# Patient Record
Sex: FEMALE | Race: OTHER | HISPANIC OR LATINO | URBAN - METROPOLITAN AREA
[De-identification: names, ages, dates, MRNs, and addresses within clinical notes are randomized per-mention and may not be internally consistent; named-entity substitution may affect disease eponyms.]

---

## 2020-12-12 ENCOUNTER — OUTPATIENT (OUTPATIENT)
Dept: OUTPATIENT SERVICES | Facility: HOSPITAL | Age: 35
LOS: 1 days | End: 2020-12-12
Payer: MEDICARE

## 2020-12-12 DIAGNOSIS — Z11.59 ENCOUNTER FOR SCREENING FOR OTHER VIRAL DISEASES: ICD-10-CM

## 2020-12-12 LAB — SARS-COV-2 RNA SPEC QL NAA+PROBE: SIGNIFICANT CHANGE UP

## 2020-12-12 PROCEDURE — U0003: CPT

## 2020-12-13 DIAGNOSIS — Z11.59 ENCOUNTER FOR SCREENING FOR OTHER VIRAL DISEASES: ICD-10-CM

## 2021-04-15 ENCOUNTER — EMERGENCY (EMERGENCY)
Facility: HOSPITAL | Age: 36
LOS: 0 days | Discharge: HOME | End: 2021-04-15
Attending: EMERGENCY MEDICINE
Payer: SELF-PAY

## 2021-04-15 VITALS
TEMPERATURE: 98 F | HEART RATE: 72 BPM | SYSTOLIC BLOOD PRESSURE: 111 MMHG | DIASTOLIC BLOOD PRESSURE: 78 MMHG | RESPIRATION RATE: 17 BRPM | OXYGEN SATURATION: 100 %

## 2021-04-15 VITALS — WEIGHT: 179.9 LBS | HEIGHT: 60 IN
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
## 2021-04-15 DIAGNOSIS — Z23 ENCOUNTER FOR IMMUNIZATION: ICD-10-CM

## 2021-04-15 DIAGNOSIS — M79.672 PAIN IN LEFT FOOT: ICD-10-CM

## 2021-04-15 DIAGNOSIS — S81.812A LACERATION WITHOUT FOREIGN BODY, LEFT LOWER LEG, INITIAL ENCOUNTER: ICD-10-CM
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
## 2021-04-15 PROCEDURE — 99283 EMERGENCY DEPT VISIT LOW MDM: CPT | Mod: 25

## 2021-04-15 PROCEDURE — 73590 X-RAY EXAM OF LOWER LEG: CPT | Mod: LT

## 2021-04-15 PROCEDURE — 90715 TDAP VACCINE 7 YRS/> IM: CPT

## 2021-04-15 PROCEDURE — 73590 X-RAY EXAM OF LOWER LEG: CPT | Mod: 26,LT

## 2021-04-15 PROCEDURE — 90471 IMMUNIZATION ADMIN: CPT

## 2021-04-15 PROCEDURE — 12002 RPR S/N/AX/GEN/TRNK2.6-7.5CM: CPT

## 2021-04-15 RX ORDER — CEPHALEXIN 500 MG
500 CAPSULE ORAL ONCE
Refills: 0 | Status: COMPLETED | OUTPATIENT
Start: 2021-04-15 | End: 2021-04-15

## 2021-04-15 RX ORDER — TETANUS TOXOID, REDUCED DIPHTHERIA TOXOID AND ACELLULAR PERTUSSIS VACCINE, ADSORBED 5; 2.5; 8; 8; 2.5 [IU]/.5ML; [IU]/.5ML; UG/.5ML; UG/.5ML; UG/.5ML
0.5 SUSPENSION INTRAMUSCULAR ONCE
Refills: 0 | Status: COMPLETED | OUTPATIENT
Start: 2021-04-15 | End: 2021-04-15

## 2021-04-15 RX ORDER — CEPHALEXIN 500 MG
1 CAPSULE ORAL
Qty: 28 | Refills: 0
Start: 2021-04-15 | End: 2021-04-21

## 2021-04-15 RX ADMIN — TETANUS TOXOID, REDUCED DIPHTHERIA TOXOID AND ACELLULAR PERTUSSIS VACCINE, ADSORBED 0.5 MILLILITER(S): 5; 2.5; 8; 8; 2.5 SUSPENSION INTRAMUSCULAR at 19:12

## 2021-04-15 RX ADMIN — Medication 500 MILLIGRAM(S): at 20:11

## 2021-04-15 NOTE — ED STATDOCS - OBJECTIVE STATEMENT
36 y/o F with no PMHx presents to the ED c/o +laceration to L calf incurred on broke piece of glass around 11AM today. No fever. NKDA. Pt is Cymro speaking, [place]  used, ID#: [id number]

## 2021-04-15 NOTE — ED ADULT NURSE NOTE - NSIMPLEMENTINTERV_GEN_ALL_ED
Implemented All Universal Safety Interventions:  [place] to call system. Call bell, personal items and telephone within reach. Instruct patient to call for assistance. Room bathroom lighting operational. Non-slip footwear when patient is off stretcher. Physically safe environment: no spills, clutter or unnecessary equipment. Stretcher in lowest position, wheels locked, appropriate side rails in place.

## 2021-04-15 NOTE — ED STATDOCS - PATIENT PORTAL LINK FT
You can access the FollowMyHealth Patient Portal offered by [facility] by registering at the following website: [email or web address]. By joining [employer]’s FollowMyHealth portal, you will also be able to view your health information using other applications (apps) compatible with our system.

## 2021-04-24 ENCOUNTER — EMERGENCY (EMERGENCY)
Facility: HOSPITAL | Age: 36
LOS: 0 days | Discharge: HOME | End: 2021-04-24
Attending: FAMILY MEDICINE
Payer: SELF-PAY

## 2021-04-24 VITALS
OXYGEN SATURATION: 96 % | HEART RATE: 81 BPM | TEMPERATURE: 98 F | SYSTOLIC BLOOD PRESSURE: 118 MMHG | RESPIRATION RATE: 18 BRPM | DIASTOLIC BLOOD PRESSURE: 81 MMHG

## 2021-04-24 VITALS — HEIGHT: 60 IN | WEIGHT: 179.02 LBS
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
## 2021-04-24 DIAGNOSIS — S81.812D LACERATION WITHOUT FOREIGN BODY, LEFT LOWER LEG, SUBSEQUENT ENCOUNTER: ICD-10-CM

## 2021-04-24 PROCEDURE — G0463: CPT

## 2021-04-24 PROCEDURE — L9995: CPT

## 2021-04-24 NOTE — ED STATDOCS - OBJECTIVE STATEMENT
Pt is a 34 yo hf with no pmh here for suture removal of continuous suture left calf. Pt is a 34 yo hf with no pmh here for suture removal of continuous suture left calf. No fever or redness.

## 2021-04-24 NOTE — ED STATDOCS - PATIENT PORTAL LINK FT
You can access the FollowMyHealth Patient Portal offered by [facility] by registering at the following website: [email or web address]. By joining [employer]’s FollowMyHealth portal, you will also be able to view your health information using other applications (apps) compatible with our system.

## 2021-04-25 PROBLEM — Z78.9 OTHER SPECIFIED HEALTH STATUS: Chronic | Status: ACTIVE | Noted: 2021-04-15

## 2021-05-01 NOTE — ED PROCEDURE NOTE - CPROC ED INDICATIONS1
From: Jesse Lambert  To: VANESSA Nguyen - CNP  Sent: 10/8/2020 2:35 PM EDT  Subject: Visit Follow-Up Question    Kallie Duane,    I came in a few weeks ago for BV, I was prescribed antibiotics for treatment. The treatment worker initially but I think the BV has returned. Can you give me a call to advise?      ThanksNilay suture removal

## 2021-08-27 ENCOUNTER — APPOINTMENT (OUTPATIENT)
Age: 36
End: 2021-08-27
Payer: COMMERCIAL

## 2021-08-27 PROCEDURE — 0011A: CPT

## 2021-09-24 ENCOUNTER — APPOINTMENT (OUTPATIENT)
Age: 36
End: 2021-09-24
Payer: COMMERCIAL

## 2021-09-24 PROCEDURE — 0012A: CPT

## 2021-10-08 PROBLEM — Z00.00 ENCOUNTER FOR PREVENTIVE HEALTH EXAMINATION: Status: ACTIVE | Noted: 2021-10-08

## 2022-01-21 ENCOUNTER — EMERGENCY (EMERGENCY)
Facility: HOSPITAL | Age: 37
LOS: 0 days | Discharge: HOME | End: 2022-01-21
Attending: EMERGENCY MEDICINE
Payer: MEDICAID

## 2022-01-21 VITALS
OXYGEN SATURATION: 96 % | SYSTOLIC BLOOD PRESSURE: 116 MMHG | HEART RATE: 98 BPM | TEMPERATURE: 97 F | DIASTOLIC BLOOD PRESSURE: 78 MMHG | RESPIRATION RATE: 16 BRPM

## 2022-01-21 VITALS — HEIGHT: 60 IN

## 2022-01-21 DIAGNOSIS — N64.4 MASTODYNIA: ICD-10-CM

## 2022-01-21 DIAGNOSIS — R07.89 OTHER CHEST PAIN: ICD-10-CM

## 2022-01-21 PROCEDURE — 76642 ULTRASOUND BREAST LIMITED: CPT | Mod: RT

## 2022-01-21 PROCEDURE — 76642 ULTRASOUND BREAST LIMITED: CPT | Mod: 26,RT

## 2022-01-21 PROCEDURE — 99284 EMERGENCY DEPT VISIT MOD MDM: CPT | Mod: 25

## 2022-01-21 PROCEDURE — 93010 ELECTROCARDIOGRAM REPORT: CPT

## 2022-01-21 PROCEDURE — 93005 ELECTROCARDIOGRAM TRACING: CPT

## 2022-01-21 PROCEDURE — 99284 EMERGENCY DEPT VISIT MOD MDM: CPT

## 2022-01-21 RX ORDER — IBUPROFEN 200 MG
400 TABLET ORAL ONCE
Refills: 0 | Status: COMPLETED | OUTPATIENT
Start: 2022-01-21 | End: 2022-01-21

## 2022-01-21 RX ADMIN — Medication 400 MILLIGRAM(S): at 20:26

## 2022-01-21 NOTE — ED ADULT TRIAGE NOTE - CHIEF COMPLAINT QUOTE
Pt arrives to ED complaining of chest pain "on the inside and around the nipple area." denies medical history.

## 2022-01-21 NOTE — ED STATDOCS - CLINICAL SUMMARY MEDICAL DECISION MAKING FREE TEXT BOX
No signs of abscess.  No other findings on exam.  D/c home with follow up breast surgery.  Needs mammography, discussed with patient.

## 2022-01-21 NOTE — ED STATDOCS - OBJECTIVE STATEMENT
37 y/o female with no pertinent PMHx presents to the ED c/o right breast pain since last night. Denies fever, nipple discharge, or other symptoms. Denies breastfeeding, trauma to chest, and skin changes. LMP: unsure exact date but seems like last month. No other complaints at this time.    ID# [id number]

## 2022-01-21 NOTE — ED STATDOCS - PRO INTERPRETER NEED 2
62 y/o F was accidently bit by her fully immunized dog PTA on left hand.  She had adacel 12/17.  Was sent by [facility] with [place]-ray.  Bleeding is controlled, Dr. Quiroz is here with patient. [place]

## 2022-01-21 NOTE — ED STATDOCS - NSFOLLOWUPINSTRUCTIONS_ED_ALL_ED_FT
Dolor a la palpación de las mamas    Breast Tenderness      El dolor a la palpación de las mamas es un problema frecuente en las mujeres de todas las edades, tyron también puede ocurrir en los hombres. El dolor a la palpación de las mamas puede oscilar desde molestias leves hasta un dolor intenso. En las mujeres, el dolor generalmente es intermitente y se relaciona con el ciclo menstrual, tyron puede ser martha.    Son muchas las causas posibles del dolor a la palpación de las mamas, incluidos los cambios hormonales, infecciones y el uso de ciertos medicamentos. Puede someterse a pruebas carlo coco mamografía o coco ecografía, para detectar hallazgos inusuales. Por lo general, el dolor a la palpación de las mamas no significa que usted tenga cáncer de mama.      Siga estas instrucciones en freedman casa:      Control del dolor y de las molestias    •Si se lo indican, aplique hielo sobre la safia dolorida. Para hacer esto:  •Ponga el hielo en coco bolsa plástica.      •Coloque coco toalla entre la piel y la bolsa.      •Coloque el hielo nael 20 minutos, 2 a 3 veces por día.        •Use un sostén de soporte, especialmente al hacer actividad física. Quizás también desee usar olga sostén para dormir, si siente las mamas muy sensibles.      Medicamentos     •Use los medicamentos de venta jing y los recetados solamente carlo se lo haya indicado el médico. Si el dolor es causado por alguna infección, posiblemente le receten un antibiótico.      •Si le recetaron un antibiótico, tómelo o úselo carlo se lo haya indicado el médico. No deje de jackson los antibióticos aunque comience a sentirse mejor.      Comida y bebida   •El médico puede recomendarle disminuir el consumo de grasa en freedman dieta. Puede hacer lo siguiente:  •Limite el consumo de alimentos fritos.      •A la hora de cocinarlos, opte por hornearlos, hervirlos, grillarlos y asarlos a la tessa.        •Disminuya la cantidad de cafeína de freedman dieta. En cambio, ken más agua y elija bebidas sin cafeína.        Instrucciones generales      •Lleve un registro de los ely y las horas cuando tiene mayor sensibilidad en las mamas.      •Pregúntele al médico cómo debe hacerse los exámenes de mamas en freedman casa. [place] la ayudará a notar si tiene algún crecimiento o bulto fuera de lo normal.      •Concurra a todas las visitas de seguimiento carlo se lo haya indicado el médico. [place] es importante.        Comuníquese con un médico si:    •Cualquier safia de la mama está dura, enrojecida y caliente al tacto. Puede ser un signo de infección.    •Es bairon y:  •No está en etapa de lactancia y le sale líquido de los pezones, especialmente sheridan o pus.      •Tiene un bulto nuevo o doloroso en la mama que no desaparece después de la finalización del período menstrual.        •Tiene fiebre.      •El dolor no mejora o empeora.      •El dolor le impide realizar las actividades cotidianas.        Resumen    •El dolor a la palpación de las mamas puede oscilar desde molestias leves hasta un dolor intenso.      •Son muchas las causas posibles del dolor a la palpación de las mamas, incluidos los cambios hormonales, infecciones y el uso de ciertos medicamentos.      •Puede tratarse con hielo, el uso un sostén de soporte y medicamentos.      •Dennis cambios en la dieta carlo se lo haya indicado el médico.      Esta información no tiene carlo fin reemplazar el consejo del médico. Asegúrese de hacerle al médico cualquier pregunta que tenga.      Document Revised: 06/24/2020 Document Reviewed: 06/24/2020    Elsevier Patient Education © 2021 Elsevier Inc.

## 2022-01-21 NOTE — ED STATDOCS - PATIENT PORTAL LINK FT
You can access the FollowMyHealth Patient Portal offered by [facility] by registering at the following website: [email or web address]. By joining [employer]’s FollowMyHealth portal, you will also be able to view your health information using other applications (apps) compatible with our system.

## 2022-01-21 NOTE — ED STATDOCS - CARDIAC, MLM
normal rate, regular rhythm, and no murmur. tenderness to palpation over right nipple, no discharge, no palpable mass, no area of fluctuance, no overlying rash or skin changes.

## 2022-01-21 NOTE — ED STATDOCS - PROGRESS NOTE DETAILS
Through , 35 y/o Female presents to ED c/o pain to Right breast since last night.  Not breastfeeding,  Not sure of LMP, thinks last month.  Denies trauma to area.  Neg fevers, chills, changes to skin on breast.  On exam, right breast without erythema, peau-d orange, nipple DC.  Neg masses palp to Breast, axilla.  Tenderness around areola on palp.  CTA B. RRR.  Will f/u Breast US.  Ashley Cordova PA-C US without evidence of abscess in Right breast.  Will dc home to f/u with Breast Surgeon , have further imaging with Mammogram.  Ashley Cordova PA-C

## 2022-07-20 ENCOUNTER — EMERGENCY (EMERGENCY)
Facility: HOSPITAL | Age: 37
LOS: 0 days | Discharge: HOME | End: 2022-07-20
Attending: EMERGENCY MEDICINE
Payer: MEDICAID

## 2022-07-20 VITALS — WEIGHT: 199.96 LBS | HEIGHT: 60 IN

## 2022-07-20 VITALS
OXYGEN SATURATION: 98 % | SYSTOLIC BLOOD PRESSURE: 118 MMHG | HEART RATE: 69 BPM | TEMPERATURE: 98 F | DIASTOLIC BLOOD PRESSURE: 71 MMHG | RESPIRATION RATE: 18 BRPM

## 2022-07-20 DIAGNOSIS — T16.1XXA FOREIGN BODY IN RIGHT EAR, INITIAL ENCOUNTER: ICD-10-CM

## 2022-07-20 DIAGNOSIS — H92.01 OTALGIA, RIGHT EAR: ICD-10-CM

## 2022-07-20 DIAGNOSIS — R50.9 FEVER, UNSPECIFIED: ICD-10-CM
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
## 2022-07-20 PROCEDURE — 99283 EMERGENCY DEPT VISIT LOW MDM: CPT

## 2022-07-20 PROCEDURE — 69200 CLEAR OUTER EAR CANAL: CPT | Mod: RT

## 2022-07-20 PROCEDURE — 99282 EMERGENCY DEPT VISIT SF MDM: CPT

## 2022-07-20 NOTE — ED ADULT NURSE NOTE - NSICDXPASTMEDICALHX_GEN_ALL_CORE_FT
PAST MEDICAL HISTORY:  No pertinent past medical history        Mohs Histo Method Verbiage: Each section was then chromacoded and processed in the Mohs lab using the Mohs protocol and submitted for frozen section.

## 2022-07-20 NOTE — ED ADULT NURSE NOTE - CHIEF COMPLAINT QUOTE
pt. sent form [facility] [place] for right ear pain and decreased hearing x 2 week with possible q-tip stuck in ear. requires removal and ENT f/u.

## 2022-07-20 NOTE — ED STATDOCS - PATIENT PORTAL LINK FT
You can access the FollowMyHealth Patient Portal offered by [facility] by registering at the following website: [email or web address]. By joining [employer]’s FollowMyHealth portal, you will also be able to view your health information using other applications (apps) compatible with our system.

## 2022-07-20 NOTE — ED STATDOCS - PROGRESS NOTE DETAILS
EDGAR GRANDA: Received signout and discussed plan with attending. +cotton swab FB to rt ear, unable to retrieve with alligator forceps, ear irrigated with mineral oil and saline, FB removed, pain improved per pt. Ear canal with mild erythema, TM clear. Will dc with PMD f/u - pt states she goes to [facility]

## 2022-07-20 NOTE — ED ADULT TRIAGE NOTE - CHIEF COMPLAINT QUOTE
pt. sent form [facility] for right ear pain and decreased hearing x 2 week with possible q-tip stuck in ear. requires removal and ENT f/u.

## 2022-07-20 NOTE — ED STATDOCS - NS ED ATTENDING STATEMENT MOD
This was a shared visit with the LANCE. I reviewed and verified the documentation and independently performed the documented:

## 2022-07-20 NOTE — ED STATDOCS - NSFOLLOWUPINSTRUCTIONS_ED_ALL_ED_FT
Rest, stay hydrated, take all meds as previously prescribed, Followup with your PMD within 2 days for post hospital visit. Take over the counter tylenol or motrin as directed for pain. Return for worsening symptoms, ex. fever, shortness of breath, chest pain, worsening ear pain etc. Please read all the patient handouts.

## 2022-07-20 NOTE — ED STATDOCS - ENMT, MLM
white foreign body in right external ear canal. no discharge or bleeding. Nasal mucosa clear.  Mouth with normal mucosa  Throat has no vesicles, no oropharyngeal exudates and uvula is midline.

## 2022-07-20 NOTE — ED STATDOCS - OBJECTIVE STATEMENT
37 year old female with no pertinent PMHx presents to the ED c/o right ear pain and decreased hearing x 1 week. Pt states that she noticed some "black stuff" and blood come out of her right ear. Pt believes that the head of a q-tip might have broken off in her ear. Pt noted subjective fever on Saturday. No other injuries or complaints.

## 2022-07-26 ENCOUNTER — EMERGENCY (EMERGENCY)
Facility: HOSPITAL | Age: 37
LOS: 0 days | Discharge: HOME | End: 2022-07-26
Attending: EMERGENCY MEDICINE
Payer: MEDICAID

## 2022-07-26 VITALS — WEIGHT: 186.07 LBS | HEIGHT: 60 IN

## 2022-07-26 VITALS
OXYGEN SATURATION: 98 % | HEART RATE: 92 BPM | SYSTOLIC BLOOD PRESSURE: 103 MMHG | RESPIRATION RATE: 18 BRPM | TEMPERATURE: 98 F | DIASTOLIC BLOOD PRESSURE: 74 MMHG

## 2022-07-26 DIAGNOSIS — H60.91 UNSPECIFIED OTITIS EXTERNA, RIGHT EAR: ICD-10-CM

## 2022-07-26 DIAGNOSIS — H93.8X1 OTHER SPECIFIED DISORDERS OF RIGHT EAR: ICD-10-CM

## 2022-07-26 DIAGNOSIS — H92.01 OTALGIA, RIGHT EAR: ICD-10-CM

## 2022-07-26 PROCEDURE — 99283 EMERGENCY DEPT VISIT LOW MDM: CPT

## 2022-07-26 RX ORDER — NEOMYCIN/POLYMYXIN B/HYDROCORT
4 SUSPENSION, DROPS(FINAL DOSAGE FORM)(ML) OTIC (EAR)
Qty: 1 | Refills: 0
Start: 2022-07-26 | End: 2022-08-01

## 2022-07-26 NOTE — ED ADULT TRIAGE NOTE - CHIEF COMPLAINT QUOTE
Patient comes in for check up on right ear. Patient states she was here for ear infection and wants ear re checked. Patient endorses ear swelling. Denies fevers.

## 2022-07-26 NOTE — ED STATDOCS - ENMT, MLM
R ear yellowish whitish discharge TTP with inserted speculum in ear. R ear yellowish whitish discharge TTP with inserted speculum in ear. no mastoid pain.  left tm normal, airway patent and face mask intact

## 2022-07-26 NOTE — ED STATDOCS - NSFOLLOWUPCLINICS_GEN_ALL_ED_FT
[facility]  Family Medicine  [address] [place] [address]  Phone: [phone]  Fax:

## 2022-07-26 NOTE — ED STATDOCS - ATTENDING APP SHARED VISIT CONTRIBUTION OF CARE
I, Lisset Oneill MD,  performed the initial face to face bedside interview with this patient regarding history of present illness, review of symptoms and relevant past medical, social and family history.  I completed an independent physical examination.  I was the initial provider who evaluated this patient.   I personally saw the patient and performed a substantive portion of the visit including all aspects of the medical decision making.  I have signed out the follow up of any pending tests (i.e. labs, radiological studies) to the LANCE.  I have communicated the patient’s plan of care and disposition with the LANCE.  The history, relevant review of systems, past medical and surgical history, medical decision making, and physical examination was documented by the scribe in my presence and I attest to the accuracy of the documentation.

## 2022-07-26 NOTE — ED ADULT NURSE NOTE - OBJECTIVE STATEMENT
pt c/o right ear pain. pt was seen previously for ear infection and wants re evaluation. no sing of distress noted.

## 2022-07-26 NOTE — ED STATDOCS - PATIENT PORTAL LINK FT
You can access the FollowMyHealth Patient Portal offered by [facility] by registering at the following website: [email or web address]. By joining [employer]’s FollowMyHealth portal, you will also be able to view your health information using other applications (apps) compatible with our system.

## 2022-07-26 NOTE — ED STATDOCS - NSFOLLOWUPINSTRUCTIONS_ED_ALL_ED_FT
[facility] - AfterCare(R) Instructions(ER/ED)           Oído de domi    LO QUE NECESITA SABER:    El oído de domi, también llamado otitis externa, es coco infección del conducto auditivo externo. Nadeem conducto va desde la parte externa del oído hasta el tímpano. El oído de domi suele producirse cuando queda agua en el oído después de nadar. [employer] crea coco safia húmeda para el crecimiento de las bacterias. Los arañazos o daños producidos por los dedos, los hisopos de algodón u otros objetos también pueden causar oído de domi.  Anatomía del oído         INSTRUCCIONES SOBRE EL SUZANNA HOSPITALARIA:    Regrese a la zelda de emergencias si:  •Usted tiene dolor intenso en el oído.      •Repentinamente usted no puede escuchar.      •Usted de tiene nueva inflamación en la homero, detrás de elsy oídos o de freedman jagdeep.      •Repentinamente usted no puede  coco parte de la homero o la siente entumecida.      Llame a freedman médico si:  •Tiene fiebre.      •Los signos y síntomas empeoran o no desaparecen, incluso después del tratamiento.      •Usted tiene preguntas o inquietudes acerca de freedman condición o cuidado.      El tratamiento de oído de domipuede incluir la limpieza del conducto auditivo externo anisa. [place] ayudará a limpiar cualquier cera, descamación u otra secreción del oído. Es posible que luego necesite alguno de los siguientes tratamientos:  •Medicamentos:?Las gotas para los oídosayudan a combatir la infección y a disminuir el enrojecimiento y la hinchazón.      ?Acetaminofénalivia el dolor y baja la fiebre. Está disponible sin receta médica. Pregunte la cantidad y la frecuencia con que debe tomarlos. Siga las indicaciones. Evy las etiquetas de todos los demás medicamentos que esté usando para saber si también contienen acetaminofén, o pregunte a freedman médico o farmacéutico. El acetaminofén puede causar daño en el hígado cuando no se lolis de forma correcta.      ?AINEcomo el ibuprofeno, ayudan a disminuir la inflamación, el dolor y la fiebre. Nadeem medicamento está disponible con o sin coco receta médica. Los RUBA pueden causar sangrado estomacal o problemas renales en ciertas personas. Si usted lolis un medicamento anticoagulante, siempre pregúntele a freedman médico si los RUBA son seguros para usted. Siempre evy la etiqueta de nadeem medicamento y siga las instrucciones.      •Coco mecha para el oídopuede utilizarse si el conducto auditivo está obstruido. Se coloca coco mecha (pequeño tubo) de algodón o gasa en el oído. La mecha ayuda a extraer el líquido extra del conducto auditivo. Las mechas también ayudan a introducir medicamentos en el conducto auditivo.      Cómo usar las gotas para los oídos:  •Entibie el frasco de gotas para los oídos en elsy manosdurante unos minutos.      •Acuéstese de lado con el oído infectado hacia arriba.[place] ayudará a que el medicamento se desplace completamente por el conducto auditivo.      •Jale cuidadosamente el oído hacia arriba y hacia atrás.Coloque con cuidado la cantidad correcta de gotas dentro del oído. Si es posible, pida ayuda a otra persona.  Cómo colocar las gotas para oídos en adultos           •En los niños menores de 3 años,jale y sostenga suavemente el oído hacia abajo y hacia atrás.  Cómo colocar las gotas para oídos en niños           •En los niños mayores de 3 años,jale y sostenga suavemente el oído hacia arriba y hacia atrás.   Cómo colocar las gotas para los oídos en niños           •Sostenga la misma posiciónde 3 a 5 minutos para que el medicamento se absorba.      Prevenga el oído de nadador:  •Seque la parte exterior de freedman oído completamente después de nadar o bañarse.Limpie suavemente el oído externo con coco toalla o paño suave. Use tapones en los oídos cuando nade.      •No coloque hisopos de algodón ni otros objetos en los oídos.Estos pueden arañar o dañar el oído. También pueden empujar la cera del oído más adentro e irritar el oído.      •Coloque bolas de algodón suavemente en el oído externocuando se aplique laca, tinte o perfume.      Acuda a la consulta de control con freedman médico según las indicaciones:Anote elsy preguntas para que se acuerde de hacerlas nael elsy visitas.       © Copyright [employer] 2022           back to top                          © Copyright [employer] 2022

## 2022-07-26 NOTE — ED STATDOCS - PROGRESS NOTE DETAILS
36 y/o Female presents to ED c/o right ear pain s/p FB being removed from in ED recently.  (+) swelling and dc to right ear canal.  (+) pinna pull tenderness.  Neg mastoid tenderness to palp.  Supple neck.  Will dc home with cortisporin drops to [facility].  Can take Motrin or Tylenol for pain.  F/U with clinic.  Ashley Cordova PA-C

## 2022-07-26 NOTE — ED STATDOCS - OBJECTIVE STATEMENT
38 y/o female presents to the ED c/o +R ear pain. Pt came this past week for an ear infection but has no medication, pt feels its inflamed and feels pain in ear. Pt wanted to check ear and wants medication for it. Currently feeling pain in ear, no fever, pt has not put anything in her ear, was told to come to ER.  called ID [id number].

## 2022-09-26 ENCOUNTER — EMERGENCY (EMERGENCY)
Facility: HOSPITAL | Age: 37
LOS: 0 days | Discharge: HOME | End: 2022-09-26
Attending: EMERGENCY MEDICINE
Payer: MEDICAID

## 2022-09-26 VITALS
RESPIRATION RATE: 20 BRPM | DIASTOLIC BLOOD PRESSURE: 74 MMHG | SYSTOLIC BLOOD PRESSURE: 116 MMHG | OXYGEN SATURATION: 100 % | TEMPERATURE: 98 F | HEART RATE: 74 BPM

## 2022-09-26 VITALS — WEIGHT: 184.97 LBS | HEIGHT: 60 IN

## 2022-09-26 DIAGNOSIS — R09.81 NASAL CONGESTION: ICD-10-CM

## 2022-09-26 DIAGNOSIS — H60.92 UNSPECIFIED OTITIS EXTERNA, LEFT EAR: ICD-10-CM

## 2022-09-26 DIAGNOSIS — H92.02 OTALGIA, LEFT EAR: ICD-10-CM

## 2022-09-26 PROCEDURE — 99283 EMERGENCY DEPT VISIT LOW MDM: CPT

## 2022-09-26 RX ORDER — IBUPROFEN 200 MG
600 TABLET ORAL ONCE
Refills: 0 | Status: DISCONTINUED | OUTPATIENT
Start: 2022-09-26 | End: 2022-09-26

## 2022-09-26 RX ORDER — NEOMYCIN/POLYMYXIN B/HYDROCORT
1 SUSPENSION, DROPS(FINAL DOSAGE FORM)(ML) OTIC (EAR) ONCE
Refills: 0 | Status: DISCONTINUED | OUTPATIENT
Start: 2022-09-26 | End: 2022-09-26

## 2022-09-26 RX ORDER — ACETAMINOPHEN 500 MG
1000 TABLET ORAL ONCE
Refills: 0 | Status: DISCONTINUED | OUTPATIENT
Start: 2022-09-26 | End: 2022-09-26

## 2022-09-26 RX ORDER — CIPROFLOXACIN AND DEXAMETHASONE 3; 1 MG/ML; MG/ML
3 SUSPENSION/ DROPS AURICULAR (OTIC) ONCE
Refills: 0 | Status: DISCONTINUED | OUTPATIENT
Start: 2022-09-26 | End: 2022-09-26

## 2022-09-26 RX ORDER — ACETAMINOPHEN 500 MG
2 TABLET ORAL
Qty: 40 | Refills: 0
Start: 2022-09-26 | End: 2022-09-30

## 2022-09-26 RX ORDER — CIPROFLOXACIN AND DEXAMETHASONE 3; 1 MG/ML; MG/ML
4 SUSPENSION/ DROPS AURICULAR (OTIC)
Qty: 1 | Refills: 0
Start: 2022-09-26 | End: 2022-10-02

## 2022-09-26 NOTE — ED STATDOCS - ENMT, MLM
dry skin in right ear canal, mucoid material behind dry skin. narrowed ear canal. tragus tenderness. Nasal mucosa clear.  Mouth with normal mucosa  Throat has no vesicles, no oropharyngeal exudates and uvula is midline.

## 2022-09-26 NOTE — ED STATDOCS - ATTENDING APP SHARED VISIT CONTRIBUTION OF CARE
Attending Contribution to Care: I, Deneen Copeland, performed the initial face to face bedside interview with this patient regarding history of present illness, review of symptoms and relevant past medical, social and family history.  I completed an independent physical examination.  I was the initial provider who evaluated this patient. I have signed out the follow up of any pending tests (i.e. labs, radiological studies) to the ACP.  I have communicated the patient’s plan of care and disposition with the ACP.

## 2022-09-26 NOTE — ED STATDOCS - PATIENT PORTAL LINK FT
You can access the FollowMyHealth Patient Portal offered by [facility] by registering at the following website: [email or web address]. By joining [employer]’s FollowMyHealth portal, you will also be able to view your health information using other applications (apps) compatible with our system.

## 2022-09-26 NOTE — ED ADULT TRIAGE NOTE - CHIEF COMPLAINT QUOTE
PT COMPLAINING OF EAR PAIN AFTER HAVING A Q-TIP REMOVED FROM HER EAR A COUPLE OF WEEKS AGO.  PT STATES SHE WAS ON ANTIBIOTICS BUT HAS FINISHED THEM.  PT DENIES TAKING ANY MEDICATION FOR THE PAIN

## 2022-09-26 NOTE — ED STATDOCS - OBJECTIVE STATEMENT
36 y/o female presents to the ED c/o right ear pain. Pt had nasal congestion and ear pain and using q-tips a lot in her ear. Q-tip edge got stuck in ear and was seen at [facility] ED 07/2022 for similar pain. Used old ear drops she had at home with no relief.

## 2022-09-26 NOTE — ED STATDOCS - CLINICAL SUMMARY MEDICAL DECISION MAKING FREE TEXT BOX
36 y/o female with right otitis externa. Ciprodex ear drops and ibuprofen rxd and referral to [facility] clinic as pt does not have PMD.

## 2022-09-26 NOTE — ED STATDOCS - NSFOLLOWUPINSTRUCTIONS_ED_ALL_ED_FT
Oído de nadador    LO QUE NECESITA SABER:    ¿Qué es el oído de nadador?El oído de nadador, también llamado otitis externa, es coco infección del conducto auditivo externo. Nadeem conducto va desde la parte externa del oído hasta el tímpano. El oído de nadador suele producirse cuando queda agua en el oído después de nadar. Cameron crea coco safia húmeda para el crecimiento de las bacterias. Los arañazos o daños producidos por los dedos, los hisopos de algodón u otros objetos también pueden causar oído de nadador.  Anatomía del oído         ¿Qué aumenta mi riesgo de tener oído de nadador?  •La natación, en especial en shellie con niveles altos de bacterias      •El uso de dispositivos carlo auriculares o audífonos      •Limpiarse el oído con hisopos de algodón u otros objetos      •Alergias en la piel, carlo el eccema      •Irritación por la laca, los tintes para el ujan o las joyas      ¿Cuáles son los signos y síntomas del oído de nadador?  •Dolor de oído      •Conducto auditivo cat, hinchado y con picazón      •Fluido o pus que sale del oído      •Oído tapado y problemas de audición      ¿Cómo se diagnostica el oído de nadador?Freedman médico le preguntará acerca de elsy signos y síntomas. Examinará el interior de elsy oídos.    ¿Cómo se trata el oído de nadador?Es posible que freedman médico limpie anisa el conducto auditivo externo. Cameron ayudará a limpiar cualquier cera, descamación u otra secreción del oído. Es posible que luego necesite alguno de los siguientes tratamientos:  •Medicamentos:?Las gotas para los oídosayudan a combatir la infección y a disminuir el enrojecimiento y la hinchazón.      ?Acetaminofénalivia el dolor y baja la fiebre. Está disponible sin receta médica. Pregunte la cantidad y la frecuencia con que debe tomarlos. Siga las indicaciones. Evy las etiquetas de todos los demás medicamentos que esté usando para saber si también contienen acetaminofén, o pregunte a freedman médico o farmacéutico. El acetaminofén puede causar daño en el hígado cuando no se lolis de forma correcta.      ?AINEcomo el ibuprofeno, ayudan a disminuir la inflamación, el dolor y la fiebre. Nadeem medicamento está disponible con o sin coco receta médica. Los RUBA pueden causar sangrado estomacal o problemas renales en ciertas personas. Si usted lolis un medicamento anticoagulante, siempre pregúntele a freedman médico si los RUBA son seguros para usted. Siempre evy la etiqueta de nadeem medicamento y siga las instrucciones.      •Coco mecha para el oídopuede utilizarse si el conducto auditivo está obstruido. Se coloca coco mecha (pequeño tubo) de algodón o gasa en el oído. La mecha ayuda a extraer el líquido extra del conducto auditivo. Las mechas también ayudan a introducir medicamentos en el conducto auditivo.      ¿Cómo usar las gotas para los oídos?  •Entibie el frasco de gotas para los oídos en elsy manosdurante unos minutos.      •Acuéstese de lado con el oído infectado hacia arriba.Cameron ayudará a que el medicamento se desplace completamente por el conducto auditivo.      •Jale cuidadosamente el oído hacia arriba y hacia atrás.Coloque con cuidado la cantidad correcta de gotas dentro del oído. Si es posible, pida ayuda a otra persona.  Cómo colocar las gotas para oídos en adultos           •En los niños menores de 3 años,jale y sostenga suavemente el oído hacia abajo y hacia atrás.  Cómo colocar las gotas para oídos en niños           •En los niños mayores de 3 años,jale y sostenga suavemente el oído hacia arriba y hacia atrás.   Cómo colocar las gotas para los oídos en niños           •Sostenga la misma posiciónde 3 a 5 minutos para que el medicamento se absorba.      ¿Cómo puedo prevenir el oído de nadador?  •Seque la parte exterior de freedman oído completamente después de nadar o bañarse.Limpie suavemente el oído externo con coco toalla o paño suave. Use tapones en los oídos cuando nade.      •No coloque hisopos de algodón ni otros objetos en los oídos.Estos pueden arañar o dañar el oído. También pueden empujar la cera del oído más adentro e irritar el oído.      •Coloque bolas de algodón suavemente en el oído externocuando se aplique laca, tinte o perfume.      ¿Cuándo renita buscar atención inmediata?  •Usted tiene dolor intenso en el oído.      •Repentinamente usted no puede escuchar.      •Usted de tiene nueva inflamación en la homero, detrás de elsy oídos o de freedman jagdeep.      •Repentinamente usted no puede  coco parte de la homero o la siente entumecida.      ¿Cuándo renita llamar a mi médico?  •Tiene fiebre.      •Los signos y síntomas empeoran o no desaparecen, incluso después del tratamiento.      •Usted tiene preguntas o inquietudes acerca de freedman condición o cuidado.      ACUERDOS SOBRE FREEDMAN CUIDADO:    Usted tiene el derecho de ayudar a planear freedman cuidado. Aprenda todo lo que pueda sobre freedman condición y carlo darle tratamiento. Discuta elsy opciones de tratamiento con elsy médicos para decidir el cuidado que usted desea recibir. Usted siempre tiene el derecho de rechazar el tratamiento.

## 2023-03-20 ENCOUNTER — EMERGENCY (EMERGENCY)
Facility: HOSPITAL | Age: 38
LOS: 0 days | Discharge: LEFT BEFORE DISCHARGE | End: 2023-03-20
Payer: MEDICAID

## 2023-03-20 VITALS
OXYGEN SATURATION: 98 % | RESPIRATION RATE: 14 BRPM | HEART RATE: 67 BPM | TEMPERATURE: 98 F | SYSTOLIC BLOOD PRESSURE: 105 MMHG | DIASTOLIC BLOOD PRESSURE: 67 MMHG

## 2023-03-20 VITALS — WEIGHT: 186.07 LBS

## 2023-03-20 DIAGNOSIS — H92.01 OTALGIA, RIGHT EAR: ICD-10-CM

## 2023-03-20 DIAGNOSIS — Z53.21 PROCEDURE AND TREATMENT NOT CARRIED OUT DUE TO PATIENT LEAVING PRIOR TO BEING SEEN BY HEALTH CARE PROVIDER: ICD-10-CM

## 2023-03-20 PROCEDURE — L9992: CPT

## 2023-03-27 ENCOUNTER — APPOINTMENT (OUTPATIENT)
Dept: CT IMAGING | Facility: CLINIC | Age: 38
End: 2023-03-27

## 2023-03-28 ENCOUNTER — OUTPATIENT (OUTPATIENT)
Dept: OUTPATIENT SERVICES | Facility: HOSPITAL | Age: 38
LOS: 1 days | End: 2023-03-28
Payer: COMMERCIAL

## 2023-03-28 ENCOUNTER — APPOINTMENT (OUTPATIENT)
Dept: CT IMAGING | Facility: CLINIC | Age: 38
End: 2023-03-28
Payer: COMMERCIAL

## 2023-03-28 DIAGNOSIS — J32.9 CHRONIC SINUSITIS, UNSPECIFIED: ICD-10-CM

## 2023-03-28 PROCEDURE — 70486 CT MAXILLOFACIAL W/O DYE: CPT

## 2023-03-28 PROCEDURE — 70486 CT MAXILLOFACIAL W/O DYE: CPT | Mod: 26

## 2023-04-23 ENCOUNTER — EMERGENCY (EMERGENCY)
Facility: HOSPITAL | Age: 38
LOS: 0 days | Discharge: HOME | End: 2023-04-23
Attending: EMERGENCY MEDICINE
Payer: MEDICAID

## 2023-04-23 VITALS
OXYGEN SATURATION: 99 % | RESPIRATION RATE: 18 BRPM | HEART RATE: 88 BPM | SYSTOLIC BLOOD PRESSURE: 124 MMHG | DIASTOLIC BLOOD PRESSURE: 86 MMHG | TEMPERATURE: 98 F

## 2023-04-23 VITALS — WEIGHT: 179.9 LBS

## 2023-04-23 DIAGNOSIS — R51.9 HEADACHE, UNSPECIFIED: ICD-10-CM

## 2023-04-23 DIAGNOSIS — M54.2 CERVICALGIA: ICD-10-CM

## 2023-04-23 DIAGNOSIS — R73.03 PREDIABETES: ICD-10-CM

## 2023-04-23 DIAGNOSIS — H60.92 UNSPECIFIED OTITIS EXTERNA, LEFT EAR: ICD-10-CM

## 2023-04-23 DIAGNOSIS — J32.4 CHRONIC PANSINUSITIS: ICD-10-CM

## 2023-04-23 DIAGNOSIS — H92.01 OTALGIA, RIGHT EAR: ICD-10-CM

## 2023-04-23 LAB
ABO RH CONFIRMATION: SIGNIFICANT CHANGE UP
ALBUMIN SERPL ELPH-MCNC: 4 G/DL — SIGNIFICANT CHANGE UP (ref 3.3–5)
ALP SERPL-CCNC: 72 U/L — SIGNIFICANT CHANGE UP (ref 40–120)
ALT FLD-CCNC: 27 U/L — SIGNIFICANT CHANGE UP (ref 12–78)
ANION GAP SERPL CALC-SCNC: 2 MMOL/L — LOW (ref 5–17)
APTT BLD: 26.4 SEC — LOW (ref 27.5–35.5)
AST SERPL-CCNC: 18 U/L — SIGNIFICANT CHANGE UP (ref 15–37)
BASOPHILS # BLD AUTO: 0.07 K/UL — SIGNIFICANT CHANGE UP (ref 0–0.2)
BASOPHILS NFR BLD AUTO: 0.8 % — SIGNIFICANT CHANGE UP (ref 0–2)
BILIRUB SERPL-MCNC: 0.2 MG/DL — SIGNIFICANT CHANGE UP (ref 0.2–1.2)
BLD GP AB SCN SERPL QL: SIGNIFICANT CHANGE UP
BUN SERPL-MCNC: 11 MG/DL — SIGNIFICANT CHANGE UP (ref 7–23)
CALCIUM SERPL-MCNC: 8.9 MG/DL — SIGNIFICANT CHANGE UP (ref 8.5–10.1)
CHLORIDE SERPL-SCNC: 107 MMOL/L — SIGNIFICANT CHANGE UP (ref 96–108)
CO2 SERPL-SCNC: 26 MMOL/L — SIGNIFICANT CHANGE UP (ref 22–31)
CREAT SERPL-MCNC: 0.65 MG/DL — SIGNIFICANT CHANGE UP (ref 0.5–1.3)
EGFR: 116 ML/MIN/1.73M2 — SIGNIFICANT CHANGE UP
EOSINOPHIL # BLD AUTO: 0.46 K/UL — SIGNIFICANT CHANGE UP (ref 0–0.5)
EOSINOPHIL NFR BLD AUTO: 5.2 % — SIGNIFICANT CHANGE UP (ref 0–6)
GLUCOSE SERPL-MCNC: 91 MG/DL — SIGNIFICANT CHANGE UP (ref 70–99)
HCG SERPL-ACNC: <1 MIU/ML — SIGNIFICANT CHANGE UP
HCT VFR BLD CALC: 37.9 % — SIGNIFICANT CHANGE UP (ref 34.5–45)
HGB BLD-MCNC: 12.7 G/DL — SIGNIFICANT CHANGE UP (ref 11.5–15.5)
IMM GRANULOCYTES NFR BLD AUTO: 0.2 % — SIGNIFICANT CHANGE UP (ref 0–0.9)
INR BLD: 1.09 RATIO — SIGNIFICANT CHANGE UP (ref 0.88–1.16)
LACTATE SERPL-SCNC: 1.3 MMOL/L — SIGNIFICANT CHANGE UP (ref 0.7–2)
LYMPHOCYTES # BLD AUTO: 3.18 K/UL — SIGNIFICANT CHANGE UP (ref 1–3.3)
LYMPHOCYTES # BLD AUTO: 36.1 % — SIGNIFICANT CHANGE UP (ref 13–44)
MCHC RBC-ENTMCNC: 29.1 PG — SIGNIFICANT CHANGE UP (ref 27–34)
MCHC RBC-ENTMCNC: 33.5 GM/DL — SIGNIFICANT CHANGE UP (ref 32–36)
MCV RBC AUTO: 86.9 FL — SIGNIFICANT CHANGE UP (ref 80–100)
MONOCYTES # BLD AUTO: 0.45 K/UL — SIGNIFICANT CHANGE UP (ref 0–0.9)
MONOCYTES NFR BLD AUTO: 5.1 % — SIGNIFICANT CHANGE UP (ref 2–14)
NEUTROPHILS # BLD AUTO: 4.64 K/UL — SIGNIFICANT CHANGE UP (ref 1.8–7.4)
NEUTROPHILS NFR BLD AUTO: 52.6 % — SIGNIFICANT CHANGE UP (ref 43–77)
PLATELET # BLD AUTO: 223 K/UL — SIGNIFICANT CHANGE UP (ref 150–400)
POTASSIUM SERPL-MCNC: 4 MMOL/L — SIGNIFICANT CHANGE UP (ref 3.5–5.3)
POTASSIUM SERPL-SCNC: 4 MMOL/L — SIGNIFICANT CHANGE UP (ref 3.5–5.3)
PROT SERPL-MCNC: 8 GM/DL — SIGNIFICANT CHANGE UP (ref 6–8.3)
PROTHROM AB SERPL-ACNC: 12.6 SEC — SIGNIFICANT CHANGE UP (ref 10.5–13.4)
RBC # BLD: 4.36 M/UL — SIGNIFICANT CHANGE UP (ref 3.8–5.2)
RBC # FLD: 12.4 % — SIGNIFICANT CHANGE UP (ref 10.3–14.5)
SODIUM SERPL-SCNC: 135 MMOL/L — SIGNIFICANT CHANGE UP (ref 135–145)
WBC # BLD: 8.82 K/UL — SIGNIFICANT CHANGE UP (ref 3.8–10.5)
WBC # FLD AUTO: 8.82 K/UL — SIGNIFICANT CHANGE UP (ref 3.8–10.5)

## 2023-04-23 PROCEDURE — 85610 PROTHROMBIN TIME: CPT

## 2023-04-23 PROCEDURE — 85730 THROMBOPLASTIN TIME PARTIAL: CPT

## 2023-04-23 PROCEDURE — 96375 TX/PRO/DX INJ NEW DRUG ADDON: CPT

## 2023-04-23 PROCEDURE — 96374 THER/PROPH/DIAG INJ IV PUSH: CPT

## 2023-04-23 PROCEDURE — 87040 BLOOD CULTURE FOR BACTERIA: CPT

## 2023-04-23 PROCEDURE — 70481 CT ORBIT/EAR/FOSSA W/DYE: CPT | Mod: MA

## 2023-04-23 PROCEDURE — 99284 EMERGENCY DEPT VISIT MOD MDM: CPT

## 2023-04-23 PROCEDURE — 84702 CHORIONIC GONADOTROPIN TEST: CPT

## 2023-04-23 PROCEDURE — 99285 EMERGENCY DEPT VISIT HI MDM: CPT | Mod: 25

## 2023-04-23 PROCEDURE — 86850 RBC ANTIBODY SCREEN: CPT

## 2023-04-23 PROCEDURE — 70481 CT ORBIT/EAR/FOSSA W/DYE: CPT | Mod: 26,MA

## 2023-04-23 PROCEDURE — 86901 BLOOD TYPING SEROLOGIC RH(D): CPT

## 2023-04-23 PROCEDURE — 93010 ELECTROCARDIOGRAM REPORT: CPT

## 2023-04-23 PROCEDURE — 86900 BLOOD TYPING SEROLOGIC ABO: CPT

## 2023-04-23 PROCEDURE — 85025 COMPLETE CBC W/AUTO DIFF WBC: CPT

## 2023-04-23 PROCEDURE — 80053 COMPREHEN METABOLIC PANEL: CPT

## 2023-04-23 PROCEDURE — 36415 COLL VENOUS BLD VENIPUNCTURE: CPT

## 2023-04-23 PROCEDURE — 93005 ELECTROCARDIOGRAM TRACING: CPT

## 2023-04-23 PROCEDURE — 83605 ASSAY OF LACTIC ACID: CPT

## 2023-04-23 RX ORDER — CIPROFLOXACIN AND DEXAMETHASONE 3; 1 MG/ML; MG/ML
4 SUSPENSION/ DROPS AURICULAR (OTIC)
Qty: 1 | Refills: 0
Start: 2023-04-23 | End: 2023-04-29

## 2023-04-23 RX ORDER — KETOROLAC TROMETHAMINE 30 MG/ML
30 SYRINGE (ML) INJECTION ONCE
Refills: 0 | Status: DISCONTINUED | OUTPATIENT
Start: 2023-04-23 | End: 2023-04-23

## 2023-04-23 RX ORDER — PIPERACILLIN AND TAZOBACTAM 4; .5 G/20ML; G/20ML
3.38 INJECTION, POWDER, LYOPHILIZED, FOR SOLUTION INTRAVENOUS ONCE
Refills: 0 | Status: COMPLETED | OUTPATIENT
Start: 2023-04-23 | End: 2023-04-23

## 2023-04-23 RX ORDER — FLUTICASONE PROPIONATE 50 MCG
1 SPRAY, SUSPENSION NASAL
Qty: 1 | Refills: 0
Start: 2023-04-23 | End: 2023-04-29

## 2023-04-23 RX ADMIN — PIPERACILLIN AND TAZOBACTAM 200 GRAM(S): 4; .5 INJECTION, POWDER, LYOPHILIZED, FOR SOLUTION INTRAVENOUS at 19:22

## 2023-04-23 RX ADMIN — Medication 30 MILLIGRAM(S): at 19:22

## 2023-04-23 NOTE — ED ADULT NURSE NOTE - BREATHING, MLM
Spontaneous, unlabored and symmetrical Arrives with ems from home , as per her son the patient fell yesterday evening going from her wheelchair to a chair, takes asa daily. H/o cva right sided weakness. C/o left rib area pain, bruising under her left eye noted. Patient fell on a wood floor, unknown loc. Slovak speaking / son translating.  Fs = 106

## 2023-04-23 NOTE — ED ADULT TRIAGE NOTE - CHIEF COMPLAINT QUOTE
Pt presents to the ED c/o CP, headache, and right ear pain x1 week. Pt states "they told me I have something in my brain and I need to have another scan next week. PMH of prediabetes.

## 2023-04-23 NOTE — ED STATDOCS - PATIENT PORTAL LINK FT
You can access the FollowMyHealth Patient Portal offered by [facility] by registering at the following website: [email or web address]. By joining [employer]’s FollowMyHealth portal, you will also be able to view your health information using other applications (apps) compatible with our system.

## 2023-04-23 NOTE — ED STATDOCS - NSFOLLOWUPINSTRUCTIONS_ED_ALL_ED_FT
Otitis externa  Otitis Externa  Ear anatomy showing the outer ear canal and the eardrum. The outer ear canal is red due to swimmer's ear.  La otitis externa es coco infección del canal auditivo externo. El canal auditivo externo es la safia que está entre el exterior de la oreja y el tímpano. A la otitis externa también se la llama oído de nadador.    ¿Cuáles son las causas?  Las causas más frecuentes de esta afección incluyen las siguientes:  Nadar en agua sucia.  Humedad en el oído.  Coco lesión en el interior del oído.  Un objeto atascado en el oído.  Un jackie o rasguño en la parte exterior del oído o en el canal auditivo.  ¿Qué incrementa el riesgo?  Es más probable que presente esta afección si nada con frecuencia.    ¿Cuáles son los signos o síntomas?  En general, el primer síntoma de esta afección es la picazón en el canal auditivo. Los síntomas posteriores de la afección incluyen los siguientes:  Hinchazón del oído.  Enrojecimiento del oído.  Dolor de oído. El dolor puede empeorar cuando se eva de la oreja.  Secreción de pus del oído.  ¿Cómo se diagnostica?  Esta afección puede diagnosticarse con un examen del oído y un análisis del líquido que sale del oído para detectar si tiene bacterias y hongos.    ¿Cómo se trata?  El tratamiento de esta afección puede incluir:  Gotas óticas con antibiótico. Generalmente se aplican nael 10 a 14 días.  Medicamentos para reducir la picazón y la hinchazón.  Siga estas instrucciones en freedman casa:  Si le recetaron gotas óticas con antibiótico, úselas carlo se lo haya indicado el médico. No deje de usar el antibiótico aunque comience a sentirse mejor.  Use los medicamentos de venta jing y los recetados solamente carlo se lo haya indicado el médico.  Evite que le entre agua en los oídos, cralo se lo haya indicado el médico. [place] puede incluir no nadar ni practicar deportes de agua nael algunos días.  Concurra a todas las visitas de seguimiento. [place] es importante.  ¿Cómo se xochilt?  Mantenga secos los oídos. Use la punta de coco toalla para secarse los oídos después de nadar o de darse un baño.  Evite rascarse o poner objetos en el oído. Estas acciones pueden dañar el canal auditivo o remover el recubrimiento de cera que lo protege y así facilitar el crecimiento de bacterias y hongos.  Evite nadar en conchita, en agua contaminada o en piscinas que pueden no tener el cloro suficiente.  Comuníquese con un médico si:  Tiene fiebre.  Freedman oído continúa cat, hinchado, le duele o supura pus después de 3 días.  El dolor, la hinchazón o el enrojecimiento empeoran.  Siente un dolor de selvin intenso.  Solicite ayuda de inmediato si:  Tiene en la safia detrás de la oreja liliana, hinchada, le duele o está sensible.  Resumen  La otitis externa es coco infección del canal auditivo externo.  Las causas frecuentes son nadar en agua sucia, que quede humedad en el oído o tener un jackie o un rasguño en el oído.  Los síntomas son dolor, enrojecimiento e hinchazón del canal auditivo.  Si le recetaron gotas óticas con antibiótico, úselas carlo se lo haya indicado el médico. No deje de usar el antibiótico aunque comience a sentirse mejor.

## 2023-04-23 NOTE — ED STATDOCS - ENMT, MLM
debris in the R external auditory canal, likely reflective of otitis externa, TM intact, no foreign body, no bleeding, no tenderness over te mastoid, no swelling over mastoid, but does have redness overlying, nasal congestion.

## 2023-04-23 NOTE — ED STATDOCS - PROGRESS NOTE DETAILS
36 yo female presents with headache, R ear pain, dizziness x 3 week. Pt was seen by her pmd on 3/28 and had a CT of the head which showed pansinusitis and was placed on abx that she finished 1.5 weeks ago. Pt states she still has pain and congested.   R ear canal erythematous. Redness noted near the R mastoid without ttp. Will check labs, CT, iv abx, and reeval. -Abdelrahman Hutton PA-C CT showing R external ear infection. Will treat with ciprodex. Because pt had a CT showing pansinusitis and still feelign congested will send flonase and also treat with doxt sic ept was already on amoxicillin. Discussed results and plan with pt using .

## 2023-04-23 NOTE — ED STATDOCS - CLINICAL SUMMARY MEDICAL DECISION MAKING FREE TEXT BOX
CT  unremarkable for mastoiditis, or bony erosive process.  Labs are WNL.  Will treat with antibiotics for unimproved otitis externa.  F/u with ENT.  Return precautions given.

## 2023-04-23 NOTE — ED ADULT NURSE NOTE - OBJECTIVE STATEMENT
Pt presents to ED for right ear discomfort for 3 weeks. Pt states that she was seen at the [facility] and was told that she needs to get another scan since a previous scan "showed something in my brain". Denies fever/chills, cough, SOB, dizziness, chest pain.

## 2023-04-23 NOTE — ED STATDOCS - OBJECTIVE STATEMENT
38 yo F with PMHx of prediabetes presents to ED c/o HA and R ear pain x 3 weeks with associating mild pain in the neck, back, and chest, nasal congestion. Reports seeing Dr. Brunson at the [facility] and had CAT on 3/28, results showed pansinusitis, debris on the R auditory canal, and calcification of the R cerebrum, likely post inflammatory. Was placed on amoxicillin and ear drops last week, but pain persists. Denies fevers, chills, or other sx. Has been taking Tylenol for pain, no relief.

## 2023-04-29 LAB
CULTURE RESULTS: SIGNIFICANT CHANGE UP
CULTURE RESULTS: SIGNIFICANT CHANGE UP
SPECIMEN SOURCE: SIGNIFICANT CHANGE UP
SPECIMEN SOURCE: SIGNIFICANT CHANGE UP

## 2023-11-09 ENCOUNTER — APPOINTMENT (OUTPATIENT)
Dept: OTOLARYNGOLOGY | Facility: CLINIC | Age: 38
End: 2023-11-09
Payer: COMMERCIAL

## 2023-11-09 VITALS
SYSTOLIC BLOOD PRESSURE: 135 MMHG | DIASTOLIC BLOOD PRESSURE: 79 MMHG | OXYGEN SATURATION: 100 % | HEIGHT: 63 IN | HEART RATE: 88 BPM | BODY MASS INDEX: 31.89 KG/M2 | WEIGHT: 180 LBS

## 2023-11-09 DIAGNOSIS — R09.81 NASAL CONGESTION: ICD-10-CM

## 2023-11-09 PROCEDURE — 31231 NASAL ENDOSCOPY DX: CPT

## 2023-11-09 PROCEDURE — 99204 OFFICE O/P NEW MOD 45 MIN: CPT | Mod: 25

## 2023-11-09 RX ORDER — AMOXICILLIN 875 MG/1
TABLET, FILM COATED ORAL
Refills: 0 | Status: ACTIVE | COMMUNITY

## 2023-11-09 RX ORDER — NEOMYCIN SULFATE, POLYMYXIN B SULFATE, HYDROCORTISONE 3.5; 10000; 1 MG/ML; [USP'U]/ML; MG/ML
1 SOLUTION/ DROPS AURICULAR (OTIC)
Refills: 0 | Status: ACTIVE | COMMUNITY

## 2023-11-09 RX ORDER — FLUTICASONE PROPIONATE 50 UG/1
50 SPRAY, METERED NASAL DAILY
Qty: 1 | Refills: 2 | Status: ACTIVE | COMMUNITY
Start: 2023-11-09 | End: 1900-01-01

## 2023-11-09 RX ORDER — CIPROFLOXACIN AND DEXAMETHASONE 3; 1 MG/ML; MG/ML
0.3-0.1 SUSPENSION/ DROPS AURICULAR (OTIC)
Refills: 0 | Status: ACTIVE | COMMUNITY

## 2023-11-09 RX ORDER — SOD CHLOR,BICARB/SQUEEZ BOTTLE
PACKET, WITH RINSE DEVICE NASAL
Qty: 1 | Refills: 3 | Status: ACTIVE | COMMUNITY
Start: 2023-11-09 | End: 1900-01-01

## 2023-11-09 RX ORDER — MONTELUKAST 10 MG/1
TABLET, FILM COATED ORAL
Refills: 0 | Status: ACTIVE | COMMUNITY

## 2023-11-21 ENCOUNTER — APPOINTMENT (OUTPATIENT)
Dept: OTOLARYNGOLOGY | Facility: CLINIC | Age: 38
End: 2023-11-21
Payer: COMMERCIAL

## 2023-11-21 VITALS
HEIGHT: 63 IN | SYSTOLIC BLOOD PRESSURE: 107 MMHG | DIASTOLIC BLOOD PRESSURE: 71 MMHG | WEIGHT: 185 LBS | BODY MASS INDEX: 32.78 KG/M2 | HEART RATE: 79 BPM

## 2023-11-21 DIAGNOSIS — B36.9 SUPERFICIAL MYCOSIS, UNSPECIFIED: ICD-10-CM

## 2023-11-21 DIAGNOSIS — H92.11 OTORRHEA, RIGHT EAR: ICD-10-CM

## 2023-11-21 DIAGNOSIS — J33.9 NASAL POLYP, UNSPECIFIED: ICD-10-CM

## 2023-11-21 DIAGNOSIS — H62.41 SUPERFICIAL MYCOSIS, UNSPECIFIED: ICD-10-CM

## 2023-11-21 PROCEDURE — 92567 TYMPANOMETRY: CPT

## 2023-11-21 PROCEDURE — 99214 OFFICE O/P EST MOD 30 MIN: CPT

## 2023-11-21 PROCEDURE — 92557 COMPREHENSIVE HEARING TEST: CPT

## 2023-12-06 ENCOUNTER — APPOINTMENT (OUTPATIENT)
Dept: OTOLARYNGOLOGY | Facility: CLINIC | Age: 38
End: 2023-12-06

## 2024-01-30 NOTE — ED ADULT NURSE NOTE - MODE OF DISCHARGE
M Health Palmdale Lida Galicia, [facility], [facility]     GLEN Charlton      ________________________________________                                          Group Treatment Plan    Patient's Name: Juan Kolb  YOB: 2002    Date of Creation: 10/13/GLEN Charlton  January 10, 2024     Ambulatory

## 2024-10-07 ENCOUNTER — APPOINTMENT (OUTPATIENT)
Dept: OTOLARYNGOLOGY | Facility: CLINIC | Age: 39
End: 2024-10-07
Payer: COMMERCIAL

## 2024-10-07 ENCOUNTER — OUTPATIENT (OUTPATIENT)
Dept: OUTPATIENT SERVICES | Facility: HOSPITAL | Age: 39
LOS: 1 days | Discharge: HOME | End: 2024-10-07

## 2024-10-07 VITALS
DIASTOLIC BLOOD PRESSURE: 76 MMHG | BODY MASS INDEX: 33.66 KG/M2 | HEART RATE: 80 BPM | WEIGHT: 190 LBS | HEIGHT: 63 IN | SYSTOLIC BLOOD PRESSURE: 107 MMHG

## 2024-10-07 DIAGNOSIS — J33.9 NASAL POLYP, UNSPECIFIED: ICD-10-CM

## 2024-10-07 DIAGNOSIS — H92.01 OTALGIA, RIGHT EAR: ICD-10-CM

## 2024-10-07 DIAGNOSIS — R09.81 NASAL CONGESTION: ICD-10-CM

## 2024-10-07 DIAGNOSIS — M26.609 UNSPECIFIED TEMPOROMANDIBULAR JOINT DISORDER: ICD-10-CM

## 2024-10-07 PROCEDURE — 99214 OFFICE O/P EST MOD 30 MIN: CPT | Mod: 25

## 2024-10-07 PROCEDURE — 31231 NASAL ENDOSCOPY DX: CPT

## 2024-11-07 ENCOUNTER — APPOINTMENT (OUTPATIENT)
Dept: OTOLARYNGOLOGY | Facility: CLINIC | Age: 39
End: 2024-11-07

## 2024-11-07 ENCOUNTER — APPOINTMENT (OUTPATIENT)
Dept: OTOLARYNGOLOGY | Facility: CLINIC | Age: 39
End: 2024-11-07
Payer: COMMERCIAL

## 2024-11-07 VITALS — BODY MASS INDEX: 34.55 KG/M2 | WEIGHT: 195 LBS | HEIGHT: 63 IN

## 2024-11-07 DIAGNOSIS — H92.11 OTORRHEA, RIGHT EAR: ICD-10-CM

## 2024-11-07 DIAGNOSIS — J33.9 NASAL POLYP, UNSPECIFIED: ICD-10-CM

## 2024-11-07 DIAGNOSIS — R09.81 NASAL CONGESTION: ICD-10-CM

## 2024-11-07 PROCEDURE — 31231 NASAL ENDOSCOPY DX: CPT

## 2024-11-07 PROCEDURE — 99214 OFFICE O/P EST MOD 30 MIN: CPT | Mod: 25

## 2024-11-07 RX ORDER — BUDESONIDE 0.5 MG/2ML
0.5 INHALANT ORAL
Qty: 120 | Refills: 3 | Status: ACTIVE | COMMUNITY
Start: 2024-11-07 | End: 1900-01-01

## 2024-11-14 DIAGNOSIS — R09.81 NASAL CONGESTION: ICD-10-CM

## 2024-11-14 DIAGNOSIS — H92.01 OTALGIA, RIGHT EAR: ICD-10-CM

## 2024-11-14 DIAGNOSIS — M26.609 UNSPECIFIED TEMPOROMANDIBULAR JOINT DISORDER, UNSPECIFIED SIDE: ICD-10-CM

## 2024-11-14 DIAGNOSIS — J33.9 NASAL POLYP, UNSPECIFIED: ICD-10-CM

## 2024-11-16 ENCOUNTER — APPOINTMENT (OUTPATIENT)
Dept: CT IMAGING | Facility: CLINIC | Age: 39
End: 2024-11-16

## 2024-11-16 ENCOUNTER — OUTPATIENT (OUTPATIENT)
Dept: OUTPATIENT SERVICES | Facility: HOSPITAL | Age: 39
LOS: 1 days | End: 2024-11-16
Payer: COMMERCIAL

## 2024-11-16 DIAGNOSIS — R09.81 NASAL CONGESTION: ICD-10-CM

## 2024-11-16 PROCEDURE — 70486 CT MAXILLOFACIAL W/O DYE: CPT

## 2024-11-16 PROCEDURE — 70486 CT MAXILLOFACIAL W/O DYE: CPT | Mod: 26

## 2024-11-19 NOTE — ED STATDOCS - WET READ LAUNCH FT
Please print prescan out of Media Tab and then complete and sign.   Once form is completed and signed, please fax to [phone].     Please direct any questions to [phone], Option 3.   Thanks,  Forms Completion Team.   There are no Wet Read(s) to document.

## 2024-12-04 ENCOUNTER — NON-APPOINTMENT (OUTPATIENT)
Age: 39
End: 2024-12-04

## 2024-12-17 ENCOUNTER — APPOINTMENT (OUTPATIENT)
Dept: OTOLARYNGOLOGY | Facility: CLINIC | Age: 39
End: 2024-12-17